# Patient Record
Sex: FEMALE | Race: BLACK OR AFRICAN AMERICAN | Employment: FULL TIME | URBAN - METROPOLITAN AREA
[De-identification: names, ages, dates, MRNs, and addresses within clinical notes are randomized per-mention and may not be internally consistent; named-entity substitution may affect disease eponyms.]

---

## 2018-06-18 ENCOUNTER — HOSPITAL ENCOUNTER (EMERGENCY)
Age: 28
Discharge: HOME | End: 2018-06-18
Attending: EMERGENCY MEDICINE
Payer: COMMERCIAL

## 2018-06-18 VITALS
BODY MASS INDEX: 36.1 KG/M2 | SYSTOLIC BLOOD PRESSURE: 147 MMHG | RESPIRATION RATE: 16 BRPM | HEIGHT: 67 IN | HEART RATE: 82 BPM | OXYGEN SATURATION: 99 % | TEMPERATURE: 98.3 F | DIASTOLIC BLOOD PRESSURE: 86 MMHG | WEIGHT: 230 LBS
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
## 2018-06-18 DIAGNOSIS — T14.8XXA MUSCLE STRAIN: ICD-10-CM

## 2018-06-18 PROCEDURE — 99283 EMERGENCY DEPT VISIT LOW MDM: CPT | Performed by: NURSE PRACTITIONER

## 2018-06-18 RX ORDER — IBUPROFEN 800 MG/1
800 TABLET ORAL
Qty: 15 TAB | Refills: 0 | Status: SHIPPED | OUTPATIENT
Start: 2018-06-18 | End: 2018-06-23

## 2018-06-18 RX ORDER — METHOCARBAMOL 750 MG/1
750 TABLET, FILM COATED ORAL 3 TIMES DAILY
Qty: 30 TAB | Refills: 0 | Status: SHIPPED | OUTPATIENT
Start: 2018-06-18 | End: 2019-08-31
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
## 2018-06-18 NOTE — LETTER
[address] [facility] EMERGENCY DEPT 
[facility] [address] [place] [id number] 
[phone] Work/School Note Date: 6/18/2018 To Whom It May concern: 
 
Conner Espinosa was seen and treated today in the emergency room by the following provider(s): 
Attending Provider: Radha Hanna MD 
Nurse Practitioner: LAURA Valerio. Conner Espinosa needs to be excused from work 06/18/2018-06/19/2018. She may return sooner if symptoms improve.   
 
Sincerely, 
 
 
 
 
LAURA Valerio

## 2018-06-18 NOTE — ED NOTES
I have reviewed discharge instructions with the patient. The patient verbalized understanding. Patient left ED via Discharge Method: ambulatory to Home with (insert name of family/friend, self, transport self). Opportunity for questions and clarification provided. Patient given 2 scripts. To continue your aftercare when you leave the hospital, you may receive an automated call from our care team to check in on how you are doing. This is a free service and part of our promise to provide the best care and service to meet your aftercare needs.  If you have questions, or wish to unsubscribe from this service please call [phone]. Thank you for Choosing our [facility] Emergency Department.
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
## 2019-08-31 ENCOUNTER — HOSPITAL ENCOUNTER (EMERGENCY)
Age: 29
Discharge: HOME | End: 2019-08-31
Attending: EMERGENCY MEDICINE
Payer: COMMERCIAL

## 2019-08-31 VITALS
HEART RATE: 81 BPM | RESPIRATION RATE: 14 BRPM | SYSTOLIC BLOOD PRESSURE: 114 MMHG | BODY MASS INDEX: 32.49 KG/M2 | DIASTOLIC BLOOD PRESSURE: 71 MMHG | WEIGHT: 207 LBS | OXYGEN SATURATION: 99 % | TEMPERATURE: 98 F | HEIGHT: 67 IN
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
## 2019-08-31 DIAGNOSIS — G44.319 ACUTE POST-TRAUMATIC HEADACHE, NOT INTRACTABLE: ICD-10-CM

## 2019-08-31 DIAGNOSIS — M25.571 ACUTE RIGHT ANKLE PAIN: ICD-10-CM

## 2019-08-31 PROCEDURE — 99283 EMERGENCY DEPT VISIT LOW MDM: CPT | Performed by: EMERGENCY MEDICINE

## 2019-08-31 RX ORDER — NAPROXEN 375 MG/1
375 TABLET ORAL 2 TIMES DAILY WITH MEALS
Qty: 14 TAB | Refills: 0 | Status: SHIPPED | OUTPATIENT
Start: 2019-08-31 | End: 2019-09-07

## 2019-08-31 RX ORDER — CYCLOBENZAPRINE HCL 10 MG
10 TABLET ORAL 2 TIMES DAILY
Qty: 10 TAB | Refills: 0 | Status: SHIPPED | OUTPATIENT
Start: 2019-08-31 | End: 2019-09-05
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
## 2019-08-31 NOTE — ED NOTES
I have reviewed discharge instructions with the patient. The patient verbalized understanding. Patient left ED via Discharge Method: ambulatory to Home with self. Opportunity for questions and clarification provided. Patient given 2 scripts. To continue your aftercare when you leave the hospital, you may receive an automated call from our care team to check in on how you are doing. This is a free service and part of our promise to provide the best care and service to meet your aftercare needs.  If you have questions, or wish to unsubscribe from this service please call [phone]. Thank you for Choosing our [employer] [facility] Emergency Department.
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
## 2019-08-31 NOTE — DISCHARGE INSTRUCTIONS
Follow-up with orthopedics for checkup if not improved. Take anti-inflammatory and muscle relaxant as needed. Drink plenty of fluid. Icy hot to the area pain.

## 2019-08-31 NOTE — LETTER
[address] [address] EMERGENCY DEPT 
[address] [facility] [id number] [phone] Work/School Note Date: 8/31/2019 To Whom It May concern: 
 
Natasha Vee was seen and treated today in the emergency room by the following provider(s): 
Attending Provider: Porter Navarro MD.   
 
Natasha Vee {Return to school/sport/work: 8/2/19 Sincerely, David Rosales MD

## 2022-02-28 ENCOUNTER — NURSE TRIAGE (OUTPATIENT)
Dept: OTHER | Facility: CLINIC | Age: 32
End: 2022-02-28

## 2022-02-28 NOTE — TELEPHONE ENCOUNTER
Received call from Remigio at [facility] with [employer]. Subjective: Caller states \"I want to make an appointment to help manage stress. \"     Current Symptoms: \"Feeling\" stressed. \"I work so much and then I can't handle anything else. \"    Onset: Six months    Associated Symptoms: Body aches    Pain Severity: 0/10    Temperature: N/A    What has been tried: N/A    LMP: Beginning of the month Pregnant: No    Recommended disposition: See in Office Within 2 Weeks    Care advice provided, patient verbalizes understanding; denies any other questions or concerns; instructed to call back for any new or worsening symptoms. Message sent to [facility]. Attention Provider: Thank you for allowing me to participate in the care of your patient. The patient was connected to triage in response to information provided to the [facility]. Please do not respond through this encounter as the response is not directed to a shared pool.     Reason for Disposition   Symptoms interfere with sleep    Protocols used: ANXIETY AND PANIC ATTACK-ADULT-[place]

## 2022-04-22 PROBLEM — N83.201 CYSTS OF BOTH OVARIES: Status: ACTIVE | Noted: 2022-04-22

## 2022-04-22 PROBLEM — N83.202 CYSTS OF BOTH OVARIES: Status: ACTIVE | Noted: 2022-04-22

## 2022-04-22 PROBLEM — N92.0 MENORRHAGIA: Status: ACTIVE | Noted: 2022-04-22

## 2022-04-22 PROBLEM — N94.6 DYSMENORRHEA: Status: ACTIVE | Noted: 2022-04-22

## 2022-05-26 RX ORDER — SUMATRIPTAN 50 MG/1
TABLET, FILM COATED ORAL
Qty: 9 TABLET | Refills: 1 | OUTPATIENT
Start: 2022-05-26

## 2022-08-18 ENCOUNTER — OFFICE VISIT (OUTPATIENT)
Dept: FAMILY MEDICINE CLINIC | Facility: CLINIC | Age: 32
End: 2022-08-18
Payer: COMMERCIAL

## 2022-08-18 VITALS
HEIGHT: 67 IN | SYSTOLIC BLOOD PRESSURE: 118 MMHG | HEART RATE: 86 BPM | WEIGHT: 236 LBS | DIASTOLIC BLOOD PRESSURE: 80 MMHG | OXYGEN SATURATION: 97 % | TEMPERATURE: 98.1 F | BODY MASS INDEX: 37.04 KG/M2

## 2022-08-18 DIAGNOSIS — F41.9 ANXIETY: ICD-10-CM

## 2022-08-18 DIAGNOSIS — G43.009 MIGRAINE WITHOUT AURA, NOT INTRACTABLE, WITHOUT STATUS MIGRAINOSUS: ICD-10-CM

## 2022-08-18 DIAGNOSIS — Z79.899 MEDICATION MANAGEMENT: Primary | ICD-10-CM

## 2022-08-18 DIAGNOSIS — F32.0 MAJOR DEPRESSIVE DISORDER, SINGLE EPISODE, MILD (HCC): ICD-10-CM

## 2022-08-18 DIAGNOSIS — R11.0 NAUSEA: ICD-10-CM

## 2022-08-18 PROCEDURE — 99214 OFFICE O/P EST MOD 30 MIN: CPT | Performed by: NURSE PRACTITIONER

## 2022-08-18 RX ORDER — UBROGEPANT 50 MG/1
50 TABLET ORAL
Qty: 30 TABLET | Refills: 0 | Status: SHIPPED | OUTPATIENT
Start: 2022-08-18 | End: 2022-08-18

## 2022-08-18 RX ORDER — PROMETHAZINE HYDROCHLORIDE 12.5 MG/1
12.5 TABLET ORAL 2 TIMES DAILY PRN
Qty: 30 TABLET | Refills: 0 | Status: SHIPPED | OUTPATIENT
Start: 2022-08-18 | End: 2022-09-02

## 2022-08-18 ASSESSMENT — PATIENT HEALTH QUESTIONNAIRE - PHQ9
1. LITTLE INTEREST OR PLEASURE IN DOING THINGS: 0
2. FEELING DOWN, DEPRESSED OR HOPELESS: 0
SUM OF ALL RESPONSES TO PHQ QUESTIONS 1-9: 0
SUM OF ALL RESPONSES TO PHQ9 QUESTIONS 1 & 2: 0

## 2022-08-18 NOTE — PROGRESS NOTES
Bri Pate is a 29 y.o. female who presents today for the following:  Chief Complaint   Patient presents with    Migraine    Other     FMLA paperwork       No Known Allergies    Current Outpatient Medications   Medication Sig Dispense Refill    promethazine (PHENERGAN) 12.5 MG tablet Take 1 tablet by mouth 2 times daily as needed for Nausea (with migraine) may cause drowsiness avoid operating hazardous machinery 30 tablet 0    ibuprofen (ADVIL;MOTRIN) 800 MG tablet Take 800 mg by mouth every 8 hours as needed      naproxen sodium (ANAPROX) 550 MG tablet Take 550 mg by mouth 2 times daily (with meals)      SUMAtriptan (IMITREX) 50 MG tablet Take one tab PO at onset of migraine      tranexamic acid (LYSTEDA) 650 MG TABS tablet Take 1,300 mg by mouth 3 times daily      venlafaxine (EFFEXOR XR) 37.5 MG extended release capsule Take 37.5 mg by mouth daily       No current facility-administered medications for this visit. 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 Walter Vincent for migraines and FMLA paperwork. She was seen in March for her headaches and depression. She was counseled to f/u in April for her headaches and depression but for some unclear reason did not. She was started on  Effexor 37.5 mg and Imitrex as well as Motrin prn. It is noted she was referred to gyn for dysmenorrhea and ENT for enlarged tonsils.   She reports tearing, diarrhea, nausea, and photophobia with her migraines. Has not been taking the Effexor regularly for depression, helps with anxiety but not the depression. Causes her to be too relaxed at work. Gets 2-3 times a week headaches. Stressed at work. Calls out of work: missed 6 days in 3 months. Tries to drink more water. Feels more anxious than depressed. She reports the imitrex really does not help much and her headaches can last up to 3 days when she takes it. Review of Systems   Constitutional:  Negative for fatigue. Respiratory: Negative. Cardiovascular: Negative. Neurological:  Positive for headaches. Negative for dizziness. Psychiatric/Behavioral:  Negative for dysphoric mood and sleep disturbance. The patient is nervous/anxious. /80   Pulse 86   Temp 98.1 °F (36.7 °C) (Oral)   Ht 5' 7\" (1.702 m)   Wt 236 lb (107 kg)   SpO2 97%   BMI 36.96 kg/m²     Physical Exam  Constitutional:       General: She is not in acute distress. Appearance: Normal appearance. She is obese. She is not ill-appearing. HENT:      Head: Normocephalic and atraumatic. Right Ear: External ear normal.      Left Ear: External ear normal.   Eyes:      Extraocular Movements: Extraocular movements intact. Conjunctiva/sclera: Conjunctivae normal.      Pupils: Pupils are equal, round, and reactive to light. Cardiovascular:      Rate and Rhythm: Normal rate and regular rhythm. Pulses: Normal pulses. Heart sounds: Normal heart sounds. Pulmonary:      Effort: Pulmonary effort is normal.      Breath sounds: Normal breath sounds. Abdominal:      General: Bowel sounds are normal. There is no distension. Palpations: Abdomen is soft. Musculoskeletal:         General: Normal range of motion. Cervical back: Normal range of motion and neck supple. Right lower leg: No edema. Left lower leg: No edema. Skin:     General: Skin is warm and dry. Coloration: Skin is not jaundiced or pale. Neurological:      General: No focal deficit present. Mental Status: She is alert and oriented to person, place, and time. Psychiatric:         Mood and Affect: Mood normal.         Behavior: Behavior normal.         Thought Content: Thought content normal.         Judgment: Judgment normal.        1. Medication management  2. Migraine without aura, not intractable, without status migrainosus  -     promethazine (PHENERGAN) 12.5 MG tablet; Take 1 tablet by mouth 2 times daily as needed for Nausea (with migraine) may cause drowsiness avoid operating hazardous machinery, Disp-30 tablet, R-0Normal  -     Ubrogepant (UBRELVY) 50 MG TABS; Take 50 mg by mouth once as needed (migraine) May repeat dose x 1 after 2 hours. Avoid if pregnant or plan pregnancy. , Disp-30 tablet, R-0Normal  3. Major depressive disorder, single episode, mild ([address]  4. Anxiety  5. Nausea  -     promethazine (PHENERGAN) 12.5 MG tablet; Take 1 tablet by mouth 2 times daily as needed for Nausea (with migraine) may cause drowsiness avoid operating hazardous machinery, Disp-30 tablet, R-0Normal   Suggested pt try Effexor at bedtime due to somnolence with it. Discussed stop Imitrex and trial Ubrelvy. Patient informed, we will call with blood work results within one week. If you have not heard regarding results in over a week, please contact office. You can also review results on [employer]. Follow-up and Dispositions    Return in about 1 month (around 9/18/2022) for With Dr. Rodrigues/Dr. Tez Cuba pt and may be virtual, Medication Evaluation.          Ramone Tavarez, APRN - CNP

## 2022-08-20 PROBLEM — F32.0 MAJOR DEPRESSIVE DISORDER, SINGLE EPISODE, MILD (HCC): Status: ACTIVE | Noted: 2022-08-20

## 2022-08-20 PROBLEM — F41.9 ANXIETY: Status: ACTIVE | Noted: 2022-08-20

## 2022-08-20 PROBLEM — G43.009 MIGRAINE WITHOUT AURA, NOT INTRACTABLE, WITHOUT STATUS MIGRAINOSUS: Status: ACTIVE | Noted: 2022-08-20

## 2022-08-20 ASSESSMENT — ENCOUNTER SYMPTOMS: RESPIRATORY NEGATIVE: 1

## 2022-08-25 ENCOUNTER — TELEPHONE (OUTPATIENT)
Dept: FAMILY MEDICINE CLINIC | Facility: CLINIC | Age: 32
End: 2022-08-25

## 2022-08-25 NOTE — TELEPHONE ENCOUNTER
----- Message from Raymon Nageotte sent at 8/25/2022  8:51 AM EDT -----  Subject: Message to Provider    QUESTIONS  Information for Provider? Sourav called to ask about the [facility] paper work   that she brought in. She did speak with someone the other day, But still   does not know where the paper work stands at this time for completion. Thank you   ---------------------------------------------------------------------------  --------------  Roxana JULES  [phone]; OK to leave message on voicemail  ---------------------------------------------------------------------------  --------------  SCRIPT ANSWERS  Relationship to Patient?  Self

## 2022-11-21 ENCOUNTER — TELEPHONE (OUTPATIENT)
Dept: FAMILY MEDICINE CLINIC | Facility: CLINIC | Age: 32
End: 2022-11-21

## 2022-11-21 DIAGNOSIS — D72.829 LEUKOCYTOSIS, UNSPECIFIED TYPE: Primary | ICD-10-CM

## 2022-11-21 NOTE — TELEPHONE ENCOUNTER
Sourav was seen today for labs only.     Diagnoses and all orders for this visit:    Leukocytosis, unspecified type  -     CBC with Auto Differential; Future

## 2022-11-21 NOTE — TELEPHONE ENCOUNTER
----- Message from Brenda Harnett sent at 11/21/2022  9:11 AM EST -----  Subject: Referral Request    Reason for referral request? wanting blood work for her upcoming appt. Provider patient wants to be referred to(if known):     Provider Phone Number(if known):     Additional Information for Provider?   ---------------------------------------------------------------------------  --------------  [id number]    [phone]; OK to respond with electronic message via [employer] portal (only   for patients who have registered [employer] account)  ---------------------------------------------------------------------------  --------------

## 2022-11-22 ENCOUNTER — NURSE ONLY (OUTPATIENT)
Dept: FAMILY MEDICINE CLINIC | Facility: CLINIC | Age: 32
End: 2022-11-22

## 2022-11-22 DIAGNOSIS — D72.829 LEUKOCYTOSIS, UNSPECIFIED TYPE: ICD-10-CM

## 2022-11-23 DIAGNOSIS — D72.829 LEUKOCYTOSIS, UNSPECIFIED TYPE: Primary | ICD-10-CM

## 2022-11-29 ENCOUNTER — OFFICE VISIT (OUTPATIENT)
Dept: FAMILY MEDICINE CLINIC | Facility: CLINIC | Age: 32
End: 2022-11-29
Payer: COMMERCIAL

## 2022-11-29 VITALS
DIASTOLIC BLOOD PRESSURE: 84 MMHG | SYSTOLIC BLOOD PRESSURE: 130 MMHG | WEIGHT: 240 LBS | HEART RATE: 85 BPM | HEIGHT: 67 IN | BODY MASS INDEX: 37.67 KG/M2 | OXYGEN SATURATION: 99 %

## 2022-11-29 DIAGNOSIS — R73.03 PREDIABETES: ICD-10-CM

## 2022-11-29 DIAGNOSIS — G43.009 MIGRAINE WITHOUT AURA, NOT INTRACTABLE, WITHOUT STATUS MIGRAINOSUS: ICD-10-CM

## 2022-11-29 DIAGNOSIS — Z01.818 PRE-OPERATIVE CLEARANCE: Primary | ICD-10-CM

## 2022-11-29 DIAGNOSIS — F32.0 MAJOR DEPRESSIVE DISORDER, SINGLE EPISODE, MILD (HCC): ICD-10-CM

## 2022-11-29 LAB
HCG, PREGNANCY, URINE, POC: NEGATIVE
VALID INTERNAL CONTROL, POC: YES

## 2022-11-29 PROCEDURE — 81025 URINE PREGNANCY TEST: CPT | Performed by: FAMILY MEDICINE

## 2022-11-29 PROCEDURE — 93000 ELECTROCARDIOGRAM COMPLETE: CPT | Performed by: FAMILY MEDICINE

## 2022-11-29 PROCEDURE — 99214 OFFICE O/P EST MOD 30 MIN: CPT | Performed by: FAMILY MEDICINE

## 2022-11-29 ASSESSMENT — PATIENT HEALTH QUESTIONNAIRE - PHQ9
SUM OF ALL RESPONSES TO PHQ QUESTIONS 1-9: 0
1. LITTLE INTEREST OR PLEASURE IN DOING THINGS: 0
2. FEELING DOWN, DEPRESSED OR HOPELESS: 0
SUM OF ALL RESPONSES TO PHQ QUESTIONS 1-9: 0
SUM OF ALL RESPONSES TO PHQ9 QUESTIONS 1 & 2: 0

## 2022-11-29 NOTE — PATIENT INSTRUCTIONS
Patient Education        A Healthy Lifestyle: Care Instructions  A healthy lifestyle can help you feel good, have more energy, and stay at a weight that's healthy for you. You can share a healthy lifestyle with your friends and family. And you can do it on your own. Eat meals with your friends or family. You could try cooking together. Plan activities with other people. Go for a walk with a friend, try a free online fitness class, or join a sports league. Eat a variety of healthy foods. These include fruits, vegetables, whole grains, low-fat dairy, and lean protein. Choose healthy portions of food. You can use the Nutrition Facts label on food packages as a guide. Eat more fruits and vegetables. You could add vegetables to sandwiches or add fruit to cereal.  Drink water when you are thirsty. Limit soda, juice, and sports drinks. Try to exercise most days. Aim for at least 2½ hours of exercise each week. Keep moving. Work in the garden or take your dog on a walk. Use the stairs instead of the elevator. If you use tobacco or nicotine, try to quit. Ask your doctor about programs and medicines to help you quit. Limit alcohol. Men should have no more than 2 drinks a day. Women should have no more than 1. For some people, no alcohol is the best choice. Follow-up care is a key part of your treatment and safety. Be sure to make and go to all appointments, and call your doctor if you are having problems. It's also a good idea to know your test results and keep a list of the medicines you take. Where can you learn more? Go to https://shakeel.[employer]. org and sign in to your [employer] account. Enter S134 in the [facility] box to learn more about \"A Healthy Lifestyle: Care Instructions. \"     If you do not have an account, please click on the \"Sign Up Now\" link. Current as of: March 9, 2022               Content Version: 13.4  © 3301-8319 Healthwise, Incorporated.    Care instructions adapted under license by [facility] ([facility]). If you have questions about a medical condition or this instruction, always ask your healthcare professional. [address] any warranty or liability for your use of this information.

## 2022-11-29 NOTE — PROGRESS NOTES
Tylor Chairez is a 29 y.o. female who presents today for the following:  Chief Complaint   Patient presents with    Pre op clearance       No Known Allergies    Current Outpatient Medications   Medication Sig Dispense Refill    tranexamic acid (LYSTEDA) 650 MG TABS tablet Take 1,300 mg by mouth 3 times daily       No current facility-administered medications for this visit. 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 She has chronic medical problems of migraines. She is currently only taking abortive medication of ubrevly. She reports prior history of surgery with no complications from anesthesia. She had blood work required for surgery clearance done at [facility].  She has brought in copy of lab results to review. Review of Systems   Constitutional:  Negative for fatigue and unexpected weight change. Respiratory:  Negative for chest tightness, shortness of breath and wheezing. Cardiovascular:  Negative for chest pain and palpitations. Gastrointestinal:  Negative for abdominal pain. Skin:  Negative for rash. Neurological:  Positive for headaches. Negative for dizziness. Psychiatric/Behavioral:  The patient is not nervous/anxious.        /84   Pulse 85   Ht 5' 7\" (1.702 m)   Wt 240 lb (108.9 kg)   SpO2 99%   BMI 37.59 kg/m²     Physical Exam  Constitutional:       General: She is not in acute distress. Appearance: Normal appearance. She is obese. She is not ill-appearing. Eyes:      General: No scleral icterus. Pupils: Pupils are equal, round, and reactive to light. Cardiovascular:      Rate and Rhythm: Normal rate and regular rhythm. Heart sounds: Normal heart sounds. No murmur heard. Pulmonary:      Effort: Pulmonary effort is normal. No respiratory distress. Breath sounds: Normal breath sounds. No wheezing. Abdominal:      General: Bowel sounds are normal.   Musculoskeletal:      Cervical back: Neck supple. Right lower leg: No edema. Left lower leg: No edema. Skin:     Findings: No rash. Neurological:      Mental Status: She is oriented to person, place, and time. Psychiatric:         Mood and Affect: Mood normal.         Behavior: Behavior normal.        Labs resulted from [facility]  TSH: 1.7  T4: 1.03  WBC 11  Hb 12.8  Platelets 351  Glucose 118  Creatine 0.75  Sodium 140  Potassium 4.2  Albumin 4.6  INR 0.9  Ptt: 30  Hba1c 5.7  HIV negative    Results for orders placed or performed in visit on 11/29/22   AMB POC URINE PREGNANCY TEST, VISUAL COLOR COMPARISON   Result Value Ref Range    Valid Internal Control, POC yes     HCG, Pregnancy, Urine, POC Negative Negative      EKG: normal EKG, normal sinus rhythm. 1. Pre-operative clearance  Completed form for clearance. No concerns on exam today. Patient does not believe she needs chest xray. Advised to call surgeon's office to clarify. Order placed in chart if needed. -     EKG 12 Lead; Future  -     AMB POC URINE PREGNANCY TEST, VISUAL COLOR COMPARISON  -     XR CHEST PA LAT (2 VIEWS); Future   2. Prediabetes  Encouraged lifestyle changes to help prevent progression to diabetes. 3. Major depressive disorder, single episode, mild (HCC)  Stable without medication. No changes made today. 4. Migraine without aura, not intractable, without status migrainosus  Continue with abortive therapy.   Continue Pj Mera for abortive therapy. Follow-up and Dispositions    Return in about 6 months (around 5/29/2023).          Farzad Portillo MD

## 2022-11-30 PROBLEM — R73.03 PREDIABETES: Status: ACTIVE | Noted: 2022-11-30

## 2022-11-30 ASSESSMENT — ENCOUNTER SYMPTOMS
WHEEZING: 0
CHEST TIGHTNESS: 0
ABDOMINAL PAIN: 0
SHORTNESS OF BREATH: 0

## 2023-04-05 ENCOUNTER — OFFICE VISIT (OUTPATIENT)
Dept: FAMILY MEDICINE CLINIC | Facility: CLINIC | Age: 33
End: 2023-04-05
Payer: COMMERCIAL

## 2023-04-05 VITALS
DIASTOLIC BLOOD PRESSURE: 74 MMHG | HEIGHT: 67 IN | BODY MASS INDEX: 36.88 KG/M2 | OXYGEN SATURATION: 97 % | SYSTOLIC BLOOD PRESSURE: 108 MMHG | HEART RATE: 84 BPM | WEIGHT: 235 LBS

## 2023-04-05 DIAGNOSIS — G43.009 MIGRAINE WITHOUT AURA, NOT INTRACTABLE, WITHOUT STATUS MIGRAINOSUS: Primary | ICD-10-CM

## 2023-04-05 DIAGNOSIS — F32.0 MAJOR DEPRESSIVE DISORDER, SINGLE EPISODE, MILD (HCC): ICD-10-CM

## 2023-04-05 PROBLEM — R73.03 PREDIABETES: Status: RESOLVED | Noted: 2022-11-30 | Resolved: 2023-04-05

## 2023-04-05 PROCEDURE — 99214 OFFICE O/P EST MOD 30 MIN: CPT | Performed by: FAMILY MEDICINE

## 2023-04-05 RX ORDER — RIMEGEPANT SULFATE 75 MG/75MG
TABLET, ORALLY DISINTEGRATING ORAL
Qty: 16 TABLET | Refills: 11 | Status: SHIPPED | OUTPATIENT
Start: 2023-04-05

## 2023-04-05 RX ORDER — RIMEGEPANT SULFATE 75 MG/75MG
TABLET, ORALLY DISINTEGRATING ORAL
Qty: 16 TABLET | Refills: 11 | Status: SHIPPED | OUTPATIENT
Start: 2023-04-05 | End: 2023-04-05 | Stop reason: SDUPTHER

## 2023-04-05 SDOH — ECONOMIC STABILITY: TRANSPORTATION INSECURITY
IN THE PAST 12 MONTHS, HAS LACK OF TRANSPORTATION KEPT YOU FROM MEETINGS, WORK, OR FROM GETTING THINGS NEEDED FOR DAILY LIVING?: NO

## 2023-04-05 SDOH — ECONOMIC STABILITY: FOOD INSECURITY: WITHIN THE PAST 12 MONTHS, THE FOOD YOU BOUGHT JUST DIDN'T LAST AND YOU DIDN'T HAVE MONEY TO GET MORE.: SOMETIMES TRUE

## 2023-04-05 SDOH — ECONOMIC STABILITY: HOUSING INSECURITY
IN THE LAST 12 MONTHS, WAS THERE A TIME WHEN YOU DID NOT HAVE A STEADY PLACE TO SLEEP OR SLEPT IN A SHELTER (INCLUDING NOW)?: YES

## 2023-04-05 SDOH — ECONOMIC STABILITY: FOOD INSECURITY: WITHIN THE PAST 12 MONTHS, YOU WORRIED THAT YOUR FOOD WOULD RUN OUT BEFORE YOU GOT MONEY TO BUY MORE.: SOMETIMES TRUE

## 2023-04-05 SDOH — ECONOMIC STABILITY: INCOME INSECURITY: HOW HARD IS IT FOR YOU TO PAY FOR THE VERY BASICS LIKE FOOD, HOUSING, MEDICAL CARE, AND HEATING?: VERY HARD

## 2023-04-05 ASSESSMENT — PATIENT HEALTH QUESTIONNAIRE - PHQ9
8. MOVING OR SPEAKING SO SLOWLY THAT OTHER PEOPLE COULD HAVE NOTICED. OR THE OPPOSITE, BEING SO FIGETY OR RESTLESS THAT YOU HAVE BEEN MOVING AROUND A LOT MORE THAN USUAL: 0
10. IF YOU CHECKED OFF ANY PROBLEMS, HOW DIFFICULT HAVE THESE PROBLEMS MADE IT FOR YOU TO DO YOUR WORK, TAKE CARE OF THINGS AT HOME, OR GET ALONG WITH OTHER PEOPLE: 0
SUM OF ALL RESPONSES TO PHQ QUESTIONS 1-9: 8
9. THOUGHTS THAT YOU WOULD BE BETTER OFF DEAD, OR OF HURTING YOURSELF: 0
SUM OF ALL RESPONSES TO PHQ QUESTIONS 1-9: 8
5. POOR APPETITE OR OVEREATING: 2
3. TROUBLE FALLING OR STAYING ASLEEP: 3
SUM OF ALL RESPONSES TO PHQ9 QUESTIONS 1 & 2: 0
SUM OF ALL RESPONSES TO PHQ QUESTIONS 1-9: 8
4. FEELING TIRED OR HAVING LITTLE ENERGY: 3
6. FEELING BAD ABOUT YOURSELF - OR THAT YOU ARE A FAILURE OR HAVE LET YOURSELF OR YOUR FAMILY DOWN: 0
2. FEELING DOWN, DEPRESSED OR HOPELESS: 0
1. LITTLE INTEREST OR PLEASURE IN DOING THINGS: 0
SUM OF ALL RESPONSES TO PHQ QUESTIONS 1-9: 8
7. TROUBLE CONCENTRATING ON THINGS, SUCH AS READING THE NEWSPAPER OR WATCHING TELEVISION: 0

## 2023-04-05 ASSESSMENT — ENCOUNTER SYMPTOMS
CONSTIPATION: 0
NAUSEA: 1
DIARRHEA: 0

## 2023-04-05 NOTE — ASSESSMENT & PLAN NOTE
Currently uncontrolled with 2 migraines a week. Starting preventative medication with nurtec every other day. Also recommend starting b2 400 mg daily to help with prevention. Encouraged patient not to use Goody powders.

## 2023-04-05 NOTE — PROGRESS NOTES
regarding results in over a week, please contact office. You can also review results on mychart.            Marilyn Lewis MD

## 2023-04-05 NOTE — ASSESSMENT & PLAN NOTE
PHQ score is 8. Encouraged eating healthy diet, getting regular exercise, and getting more sleep. Can try melatonin to help with insomnia.

## 2023-04-05 NOTE — PATIENT INSTRUCTIONS
Take vitamin b2 400 mg daily for migraine prevention    Take melatonin 5 mg nightly for two weeks to help with insomnia

## 2023-06-02 ENCOUNTER — OFFICE VISIT (OUTPATIENT)
Dept: FAMILY MEDICINE CLINIC | Facility: CLINIC | Age: 33
End: 2023-06-02
Payer: COMMERCIAL

## 2023-06-02 VITALS
DIASTOLIC BLOOD PRESSURE: 74 MMHG | WEIGHT: 233 LBS | BODY MASS INDEX: 36.57 KG/M2 | SYSTOLIC BLOOD PRESSURE: 123 MMHG | HEART RATE: 84 BPM | HEIGHT: 67 IN | TEMPERATURE: 98.6 F | RESPIRATION RATE: 18 BRPM

## 2023-06-02 DIAGNOSIS — F32.0 MAJOR DEPRESSIVE DISORDER, SINGLE EPISODE, MILD (HCC): Primary | ICD-10-CM

## 2023-06-02 DIAGNOSIS — F43.10 PTSD (POST-TRAUMATIC STRESS DISORDER): ICD-10-CM

## 2023-06-02 DIAGNOSIS — M54.6 ACUTE THORACIC BACK PAIN, UNSPECIFIED BACK PAIN LATERALITY: ICD-10-CM

## 2023-06-02 DIAGNOSIS — G43.009 MIGRAINE WITHOUT AURA, NOT INTRACTABLE, WITHOUT STATUS MIGRAINOSUS: ICD-10-CM

## 2023-06-02 PROCEDURE — 99214 OFFICE O/P EST MOD 30 MIN: CPT | Performed by: FAMILY MEDICINE

## 2023-06-02 RX ORDER — AMITRIPTYLINE HYDROCHLORIDE 10 MG/1
10 TABLET, FILM COATED ORAL NIGHTLY
Qty: 90 TABLET | Refills: 1 | Status: SHIPPED | OUTPATIENT
Start: 2023-06-02

## 2023-06-02 RX ORDER — IBUPROFEN 800 MG/1
800 TABLET ORAL 2 TIMES DAILY PRN
Qty: 60 TABLET | Refills: 5 | Status: SHIPPED | OUTPATIENT
Start: 2023-06-02

## 2023-06-02 ASSESSMENT — PATIENT HEALTH QUESTIONNAIRE - PHQ9
9. THOUGHTS THAT YOU WOULD BE BETTER OFF DEAD, OR OF HURTING YOURSELF: 0
10. IF YOU CHECKED OFF ANY PROBLEMS, HOW DIFFICULT HAVE THESE PROBLEMS MADE IT FOR YOU TO DO YOUR WORK, TAKE CARE OF THINGS AT HOME, OR GET ALONG WITH OTHER PEOPLE: 2
SUM OF ALL RESPONSES TO PHQ9 QUESTIONS 1 & 2: 4
7. TROUBLE CONCENTRATING ON THINGS, SUCH AS READING THE NEWSPAPER OR WATCHING TELEVISION: 3
5. POOR APPETITE OR OVEREATING: 2
1. LITTLE INTEREST OR PLEASURE IN DOING THINGS: 1
3. TROUBLE FALLING OR STAYING ASLEEP: 3
SUM OF ALL RESPONSES TO PHQ QUESTIONS 1-9: 17
SUM OF ALL RESPONSES TO PHQ QUESTIONS 1-9: 17
2. FEELING DOWN, DEPRESSED OR HOPELESS: 3
8. MOVING OR SPEAKING SO SLOWLY THAT OTHER PEOPLE COULD HAVE NOTICED. OR THE OPPOSITE, BEING SO FIGETY OR RESTLESS THAT YOU HAVE BEEN MOVING AROUND A LOT MORE THAN USUAL: 2
4. FEELING TIRED OR HAVING LITTLE ENERGY: 3
SUM OF ALL RESPONSES TO PHQ QUESTIONS 1-9: 17
6. FEELING BAD ABOUT YOURSELF - OR THAT YOU ARE A FAILURE OR HAVE LET YOURSELF OR YOUR FAMILY DOWN: 0
SUM OF ALL RESPONSES TO PHQ QUESTIONS 1-9: 17

## 2023-06-02 NOTE — PROGRESS NOTES
Hemal Rockwell is a 34 y.o. female who presents today for the following:  Chief Complaint   Patient presents with    Follow-up     On migraines, patient states that she feels the same although she said that she's been forgetting to take her medication    Back Pain     Patient states that her upper/mid back is in pain, ongoing for about a month but lately is more constant and \"excruciating\", she states she hasn't done anything that she can think of to cause it. No Known Allergies    Current Outpatient Medications   Medication Sig Dispense Refill    Rimegepant Sulfate (NURTEC) 75 MG TBDP Take one tab PO every other day for migraine prevention 16 tablet 11    tranexamic acid (LYSTEDA) 650 MG TABS tablet Take 2 tablets by mouth 3 times daily       No current facility-administered medications for this visit. 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  -Migraines:  Patient reports she had migraines since around 8. She currently has two migraines a week. Migraine will typically last all day. She reports pain is severe. Pain is typically located behind her eye. It is associated with nausea, phonophobia and photophobia. She denies vomiting. Reports nurtec does not help much. Will also take ibuprofen.   Typically sleep and rest relieves headache.   -depression/anxiety/PTSD: Reports witnessed

## 2023-06-03 ASSESSMENT — ENCOUNTER SYMPTOMS
BACK PAIN: 1
CHEST TIGHTNESS: 0
SHORTNESS OF BREATH: 0

## 2023-06-03 NOTE — ASSESSMENT & PLAN NOTE
Currently uncontrolled with 2 migraines a week. Nurtec is not helping. Frequently forgets. Will try adding on amitriptyline at bedtime. Patient also suffers from insomnia. Also recommend starting b2 400 mg daily to help with prevention. Patient is using less Goody powders.
